# Patient Record
Sex: MALE | Race: OTHER | HISPANIC OR LATINO | URBAN - METROPOLITAN AREA
[De-identification: names, ages, dates, MRNs, and addresses within clinical notes are randomized per-mention and may not be internally consistent; named-entity substitution may affect disease eponyms.]

---

## 2020-07-24 ENCOUNTER — OUTPATIENT (OUTPATIENT)
Dept: OUTPATIENT SERVICES | Facility: HOSPITAL | Age: 20
LOS: 1 days | End: 2020-07-24

## 2020-07-24 ENCOUNTER — OUTPATIENT (OUTPATIENT)
Dept: OUTPATIENT SERVICES | Facility: HOSPITAL | Age: 20
LOS: 1 days | End: 2020-07-24
Payer: MEDICARE

## 2020-07-24 DIAGNOSIS — Z11.59 ENCOUNTER FOR SCREENING FOR OTHER VIRAL DISEASES: ICD-10-CM

## 2020-07-24 PROCEDURE — U0003: CPT

## 2020-07-25 DIAGNOSIS — Z11.59 ENCOUNTER FOR SCREENING FOR OTHER VIRAL DISEASES: ICD-10-CM

## 2020-07-25 LAB — SARS-COV-2 RNA SPEC QL NAA+PROBE: SIGNIFICANT CHANGE UP

## 2023-02-05 ENCOUNTER — EMERGENCY (EMERGENCY)
Facility: HOSPITAL | Age: 23
LOS: 0 days | Discharge: HOME | End: 2023-02-05
Attending: EMERGENCY MEDICINE
Payer: SELF-PAY

## 2023-02-05 VITALS
HEART RATE: 90 BPM | DIASTOLIC BLOOD PRESSURE: 84 MMHG | TEMPERATURE: 98 F | RESPIRATION RATE: 18 BRPM | OXYGEN SATURATION: 100 % | SYSTOLIC BLOOD PRESSURE: 140 MMHG

## 2023-02-05 VITALS
SYSTOLIC BLOOD PRESSURE: 118 MMHG | OXYGEN SATURATION: 97 % | TEMPERATURE: 98 F | RESPIRATION RATE: 16 BRPM | DIASTOLIC BLOOD PRESSURE: 77 MMHG | HEART RATE: 87 BPM

## 2023-02-05 DIAGNOSIS — J45.901 UNSPECIFIED ASTHMA WITH (ACUTE) EXACERBATION: ICD-10-CM

## 2023-02-05 DIAGNOSIS — F10.129 ALCOHOL ABUSE WITH INTOXICATION, UNSPECIFIED: ICD-10-CM

## 2023-02-05 PROCEDURE — 99284 EMERGENCY DEPT VISIT MOD MDM: CPT

## 2023-02-05 PROCEDURE — 99285 EMERGENCY DEPT VISIT HI MDM: CPT | Mod: 25

## 2023-02-05 PROCEDURE — 94640 AIRWAY INHALATION TREATMENT: CPT

## 2023-02-05 RX ORDER — ALBUTEROL 90 UG/1
2 AEROSOL, METERED ORAL ONCE
Refills: 0 | Status: COMPLETED | OUTPATIENT
Start: 2023-02-05 | End: 2023-02-05

## 2023-02-05 RX ADMIN — ALBUTEROL 2 PUFF(S): 90 AEROSOL, METERED ORAL at 18:22

## 2023-02-05 NOTE — ED ADULT NURSE NOTE - OBJECTIVE STATEMENT
Patient is alert and oriented X3, presenting to the ER with c/o altered mental status. Patient reports "my father left me looking over the car. I fell asleep because of my asthma. I drank 5 beers today. I have had a runny nose and cough, I have pain only when I cough.." As per triage note, "patient was found sleeping in the car which was parked in front of the bar." Patient denies CP, fevers and nausea. Patient resting in stretcher with no visible signs of distress noted.   HX: Asthma.

## 2023-02-05 NOTE — ED ADULT NURSE REASSESSMENT NOTE - NS ED NURSE REASSESS COMMENT FT1
PT CALLED SISTER FOR . I SPOKE TO SISTER AND SHE WILL COME TO  PATIENT. DR BOUDREAUX INFORMED AND WILL PREPARE FOR DC HOME.

## 2023-02-05 NOTE — ED ADULT TRIAGE NOTE - CHIEF COMPLAINT QUOTE
Subjective:     Principal Problem:<principal problem not specified>    Interval History: Continued EEG overnight without any witness seizure events    Objective:     Vital Signs (Most Recent):  Temp: 97.8 °F (36.6 °C) (04/12/22 0904)  Pulse: 87 (04/12/22 0904)  Resp: 18 (04/12/22 0904)  BP: (!) 106/49 (04/12/22 0904)  SpO2: 97 % (04/12/22 0904)   Vital Signs (24h Range):  Temp:  [97.7 °F (36.5 °C)-97.8 °F (36.6 °C)] 97.8 °F (36.6 °C)  Pulse:  [68-87] 87  Resp:  [18-20] 18  SpO2:  [97 %-99 %] 97 %  BP: (106-113)/(49-59) 106/49     Weight: 46.1 kg (101 lb 10.1 oz)  Body mass index is 20.49 kg/m².  HC Readings from Last 1 Encounters:   No data found for HC       Physical Exam  Vitals reviewed. Exam conducted with a chaperone present.   Constitutional:       General: She is active. She is not in acute distress.     Appearance: Normal appearance. She is not toxic-appearing.   HENT:      Head: Normocephalic and atraumatic.      Right Ear: External ear normal.      Left Ear: External ear normal.      Nose: Nose normal. No congestion.      Mouth/Throat:      Mouth: Mucous membranes are moist.      Pharynx: Oropharynx is clear. No posterior oropharyngeal erythema.   Eyes:      General:         Right eye: No discharge.         Left eye: No discharge.      Extraocular Movements: Extraocular movements intact.      Conjunctiva/sclera: Conjunctivae normal.      Pupils: Pupils are equal, round, and reactive to light.   Cardiovascular:      Rate and Rhythm: Normal rate and regular rhythm.      Pulses: Normal pulses.      Heart sounds: Normal heart sounds.   Pulmonary:      Effort: Pulmonary effort is normal. No respiratory distress or retractions.      Breath sounds: Normal breath sounds. No decreased air movement. No wheezing.   Abdominal:      General: Abdomen is flat. Bowel sounds are normal. There is no distension.      Palpations: Abdomen is soft. There is no mass.   Musculoskeletal:         General: Normal range of motion.  Pt BIBA from the street. Pt found sleeping in passenger seat in his car in front of the bar. Pt alert but confused, Hong Konger speaking, AOB+      Cervical back: Normal range of motion and neck supple.   Skin:     General: Skin is warm and dry.      Capillary Refill: Capillary refill takes less than 2 seconds.      Findings: No rash.   Neurological:      General: No focal deficit present.      Mental Status: She is alert, oriented to person, place, and time and oriented for age.      Cranial Nerves: No cranial nerve deficit.      Sensory: No sensory deficit.      Motor: No weakness.      Coordination: Coordination normal.   Psychiatric:         Mood and Affect: Mood normal.         Speech: Speech normal.         Behavior: Behavior normal.       NEUROLOGICAL EXAMINATION:     MENTAL STATUS   Oriented to person, place, and time.   Speech: speech is normal     CRANIAL NERVES     CN III, IV, VI   Pupils are equal, round, and reactive to light.

## 2023-02-05 NOTE — ED PROVIDER NOTE - CLINICAL SUMMARY MEDICAL DECISION MAKING FREE TEXT BOX
23 yo male BIBA to the ED from the street. Pt was found sleeping in passenger seat in his car in front of the bar. Pt c/o of mild SOB, cough. +AOB. ? of hx of asthma, has used Albuterol in the past. Pt mildly intoxicated in no acute distress. BSS slight wheezing on exam, 100% pulse ox. Impression, alcohol intoxication mild asthma. Plan: Albuterol 2 puffs monitor, observe, reassess 23 yo male BIBA to the ED from the street. Pt was found sleeping in passenger seat in his car in front of the bar. Pt c/o of mild SOB, cough. +AOB. ? of hx of asthma, has used Albuterol in the past. Pt mildly intoxicated in no acute distress. BSS slight wheezing on exam, 100% pulse ox. Impression, alcohol intoxication mild asthma.   Plan: Albuterol 2 puffs monitor, observe, reassess.    19:55:  Pt reassessed, no active complaints, feels breathing + better, clinically stable.  ED RN spoke with sister: she's en route to pick him up.  Pt stable for Dc in family's care. 21 yo male BIBA to the ED from the street. Pt was found sleeping in passenger seat in his car in front of the bar. Pt c/o of mild SOB, cough. +AOB. ? of hx of asthma, has used Albuterol in the past. Pt mildly intoxicated in no acute distress. VSS, slight wheezing on exam, 100% pulse ox on room air.   Impression: alcohol intoxication mild asthma exacerbation   Plan: Albuterol 2 puffs monitor, observe, reassess.    19:55:  Pt reassessed, no active complaints, feels breathing + better, clinically stable.  ED RN spoke with sister: she's en route to pick him up.  Pt stable for Dc in family's care, to use albuterol MDI given in ED.

## 2023-02-05 NOTE — ED PROVIDER NOTE - CONSTITUTIONAL, MLM
normal...  male alert no respiratory distress, non toxic, no acute distress  male alert no respiratory distress, non-toxic, no acute distress  male alert no respiratory distress, non-toxic, no acute distress.  Pt speaks comfortably in full sentences.

## 2023-02-05 NOTE — ED PROVIDER NOTE - ENMT, MLM
Airway patent, Nasal mucosa clear. Throat has no vesicles, no oropharyngeal exudates and uvula is midline. NC/AT oropharynx clear mucus membrane slightly dry, no clinical evidence of facial injury, voice is mildly hoarse.

## 2023-02-05 NOTE — ED PROVIDER NOTE - NSFOLLOWUPCLINICS_GEN_ALL_ED_FT
[facility] hospital  Family Medicine  [address] [place] [address]  Phone: [phone]  Fax:

## 2023-02-05 NOTE — ED PROVIDER NOTE - MUSCULOSKELETAL, MLM
AVENDANO x4 spine appears normal, range of motion is not limited, no muscle or joint tenderness AVENDANO x4 spine appears normal, range of motion is not limited, no muscle or joint tenderness.

## 2023-02-05 NOTE — ED ADULT NURSE NOTE - CHIEF COMPLAINT QUOTE
Pt BIBA from the street. Pt found sleeping in passenger seat in his car in front of the bar. Pt alert but confused, Estonian speaking, AOB+

## 2023-02-05 NOTE — ED PROVIDER NOTE - RESPIRATORY, MLM
Breath sounds clear and equal bilaterally, slight expiratory wheezing no retractions, pt not coughing during the exam Breath sounds clear and equal bilaterally, slight expiratory wheezing, no retractions, pt not coughing during the exam.  Normal respirations.

## 2023-02-05 NOTE — ED ADULT NURSE REASSESSMENT NOTE - NS ED NURSE REASSESS COMMENT FT1
PT ALERT AND ORIENTED TO PLACE, BREATHING UNLABORED, VSS, AFEBRILE, AND APPEARS IN NAD. VOIDED 500ML OF CLEAR URINE. FOOD AND JUICE GIVEN. UPDATED ON STATUS AND POC. WILL CONTINUE TO MONITOR.

## 2023-02-05 NOTE — ED PROVIDER NOTE - NSFOLLOWUPINSTRUCTIONS_ED_ALL_ED_FT
Avoid abusing alcohol.  Albuterol inhaler: 2 puffs every 6 hours as needed for wheezing, shortness of breath.  Follow up [facility].      Ataque de asma    Asthma Attack    Upper body outline showing parts of the respiratory system, highlighting the bronchi.   El ataque de asma, también llamado exacerbación del asma o broncoespasmo deirdre, es el estrechamiento y endurecimiento repentino de las vías respiratorias inferiores (bronquios) en los pulmones, que puede dificultar la respiración. El estrechamiento es causado por la inflamación y el endurecimiento del músculo liso que rodea las vías respiratorias inferiores en los pulmones.    Los ataques de asma pueden provocar tos, silbidos agudos al respirar, jessica siempre al exhalar (sibilancias), problemas para respirar (falta de aire) y dolor de pecho. Las vías respiratorias pueden producir mucosidad adicional causada por la inflamación y la irritación. Nataliia un ataque de asma, puede ser difícil respirar. Es importante recibir tratamiento de inmediato. Los ataques de asma pueden ser desde leves hasta potencialmente mortales.      ¿Cuáles son las causas?    Las posibles causas o desencadenantes de esta afección incluyen los siguientes:  •Alérgenos domésticos, bradley polvo, caspa de mascotas y cucarachas.      •El moho y el polen de los árboles o el césped.      •Las sustancias contaminantes del aire, bradley los limpiadores del hogar, los aerosoles, los olores intensos y el humo de cualquier clase.      •Cambios climáticos y aire frío.      •El estrés y las emociones felipe, bradley llorar o reír intensamente.      •El ejercicio o la actividad física que exige mucha energía.    •Ciertos medicamentos o afecciones médicas, tales bradley:  •Aspirina o betabloqueantes.      •Enfermedades infecciosas o inflamatorias, bradley la gripe (influenza), el resfrío, la neumonía o la inflamación de las membranas nasales (rinitis).      •Enfermedad de reflujo gastroesofágico (ERGE). La ERGE es pillo afección en la que el ácido estomacal vuelve al esófago y se derrama en la tráquea, lo que puede irritar las vías respiratorias.          ¿Cuáles son los signos o síntomas?    Los síntomas de esta afección incluyen:  •Sibilancias.      •Tos excesiva. [place] puede ocurrir solamente por la noche.      •Opresión o dolor en el pecho.      •Falta de aire.      •Dificultad para enunciar oraciones completas.      •Sensación de que no puede obtener el aire suficiente aunque [place] un esfuerzo por respirar (falta de aire).        ¿Cómo se diagnostica?    Esta afección se puede diagnosticar en función de lo siguiente:  •Un examen físico y los antecedentes médicos.      •Los síntomas.    •Estudios para buscar otras causas de los síntomas u otras afecciones que puedan desencadenar el ataque de asma. Estas pruebas pueden incluir lo siguiente:  •Pillo radiografía de tórax.      •Análisis de lee.      •Estudios de la función pulmonar (espirometría) para evaluar el flujo de aire en los pulmones.          ¿Cómo se trata?    El tratamiento de esta afección depende de la intensidad y la causa del ataque de asma.•En el mariela de los ataques leves, puede recibir medicamentos mediante un inhalador manual (inhalador con medidor de dosis, o IMD) o mediante un aparato que transforma el medicamento líquido en un vapor (nebulizador). Algunos de estos medicamentos son los siguientes:  •Medicamentos de rescate o de alivio rápido que relajan velozmente las vías respiratorias y los pulmones.      •Medicamentos de acción prolongada que se usan a diario para prevenir (controlar) los síntomas del asma.        •En el mariela de los ataques moderados o graves, es posible que lo traten con medicamentos con corticoesteroides por boca o mediante pillo inyección por vía intravenosa en el hospital.      •En el mariela de los ataques graves, puede necesitar oxigenoterapia o pillo máquina para respirar (respirador).      •Si la causa del ataque de asma es pillo infección por pillo bacteria, le darán antibióticos.        Siga estas instrucciones en robbins casa:    Medicamentos     •Use los medicamentos de venta hernandez y los recetados solamente bradley se lo haya indicado el médico.       •Si le recetaron un antibiótico, tómelo bradley se lo haya indicado el médico. No deje de usar el antibiótico aunque comience a sentirse mejor.      •Informe al médico si está embarazada o puede estar embarazada, para que abril se cerciore de que el medicamento para el asma pueda usarse con seguridad nataliia el embarazo.        Evite los factores desencadenantes   A sign showing that a person should not smoke.   •Lleve un registro de las cosas que desencadenan los ataques de asma. Evite la exposición a estos desencadenantes.      • No consuma ningún producto que contenga nicotina o tabaco. Estos productos incluyen cigarrillos, tabaco para mascar y aparatos de vapeo, bradley los cigarrillos electrónicos. Si necesita ayuda para dejar de fumar, consulte al médico.      •Cuando esté alto el nivel de polen, contaminación del aire o humedad, mantenga las ventanas cerradas y use el aire acondicionado o vaya a lugares con aire acondicionado.      Plan de acción para controlar el asma   •Trabaje con el médico para elaborar un plan escrito para el control y el tratamiento de los ataques de asma (plan de acción para el asma). El plan debe incluir lo siguiente:  •Pillo lista de los factores desencadenantes del asma y el modo de evitarlos.      •Pillo lista de los síntomas que puede tener nataliia un ataque de asma.      •Información sobre qué medicamento maren, cuándo tomarlo y qué cantidad.      •Información para ayudar a comprender las mediciones de flujo mirta.      •Medidas diarias que puede maren para controlar los síntomas del asma.      •Información de contacto para yolanda médicos.        •Si tiene un ataque de asma, actúe con rapidez. Siga los pasos de emergencia de robbins plan escrito de acción para el asma. [place] puede hacer que no necesite ir al hospital.      •Hable con un familiar o amigo cercano sobre robbins plan de acción para el asma y a quién contactar en mariela de que necesite ayuda.      Instrucciones generales     •Evite el ejercicio o la actividad física excesiva hasta que el ataque de asma se termine.      •Manténgase al día con todas las vacunas, bradley las vacunas antigripal y contra la neumonía.      •Concurra a todas las visitas de seguimiento. [place] es importante.        Comuníquese con un médico si:    •Ha seguido robbins plan de acción nataliia 1 hora y robbins lectura del flujo mirta se mantiene entre el 50 % y el 79 % de robbins mejor valor. [place] se encuentra en la neno amarilla que significa “precaución”.      •Necesita usar robbins medicamento de alivio rápido con mayor frecuencia que lo habitual.      •Los medicamentos le causan efectos secundarios, bradley erupción cutánea, picazón, hinchazón o dificultad para respirar.      •Los síntomas no mejoran después de usar medicamentos.      •Tiene fiebre.        Solicite ayuda de inmediato si:    •Robbins flujo mirta es katya que el 50 % del mejor valor personal. [place] se encuentra en la neno rishi, lo que significa “peligro”.      •Siente dolor o molestias en el pecho.      •Los medicamentos ya no tienen el efecto deseado.      •Al toser, elimina mucosidad con lee.      •Tiene fiebre, y los síntomas empeoran repentinamente.      •Tiene dificultad para tragar.      •Se siente muy cansado y respirar le provoca cansancio.      Estos síntomas pueden indicar pillo emergencia. Solicite ayuda de inmediato. Llame al 911.   • No espere a sumit si los síntomas desaparecen.        • No conduzca por yolanda propios medios hasta el hospital.         Resumen    •La causa de los ataques de asma es el estrechamiento o la falta de espacio en las vías respiratorias, lo que causa falta de aire, tos y sibilancia.      •Los factores desencadenantes de un ataque de asma pueden ser varios, tales bradley alérgenos, cambios climáticos, actividad física, olores intensos y humo de cualquier clase.      •Si tiene un ataque de asma, actúe con rapidez. Siga los pasos de emergencia de robbins plan escrito de acción para el asma.      •Busque ayuda de inmediato si tiene pillo intensa dificultad para respirar, dolor en el pecho o fiebre, o si los medicamentos que tiene en robbins hogar ya no son eficaces para aliviar los síntomas.      Esta información no tiene bradley fin reemplazar el consejo del médico. Asegúrese de hacerle al médico cualquier pregunta que tenga.

## 2023-02-05 NOTE — ED PROVIDER NOTE - PATIENT PORTAL LINK FT
You can access the FollowMyHealth Patient Portal offered by [facility] by registering at the following website: [email or web address]. By joining [facility]’s FollowMyHealth portal, you will also be able to view your health information using other applications (apps) compatible with our system.

## 2023-02-05 NOTE — ED PROVIDER NOTE - OBJECTIVE STATEMENT
[id number]  21 yo male wPMHx of asthma BIBA to the ED from the street. Pt was found sleeping in passenger seat in his car in front of the bar. Pt c/o of SOB, cough with whit fleam. +AOB. Pt admits to drinking, denies any drug use. Pt is Covid vaccinated.  Denies any pain, chest pain, fever, SI/HI.  [id number]  21 yo male w/ PMHx of asthma BIBA to the ED from the street. Pt was found sleeping in passenger seat in his car in front of the bar. Pt c/o of mild SOB, cough with white phlegm. +AOB. Pt admits to drinking EtOH, denies any drug use. Pt is Covid vaccinated.  Denies any pain, chest pain, fever, SI/HI.

## 2024-10-02 NOTE — ED ADULT NURSE NOTE - TELEPHONIC ID NUMBER OF THE INTERPRETER
[Recent Weight Loss (___ Lbs)] : recent weight loss: [unfilled] lbs [Fatigue] : no fatigue [Decreased Appetite] : appetite not decreased [Recent Weight Gain (___ Lbs)] : no recent weight gain [Visual Field Defect] : no visual field defect [Dysphagia] : no dysphagia [Chest Pain] : no chest pain [Palpitations] : no palpitations [Lower Ext Edema] : no lower extremity edema [Shortness Of Breath] : no shortness of breath [Cough] : no cough [Orthopnea] : no orthopnea [Nausea] : no nausea [Constipation] : no constipation [Abdominal Pain] : no abdominal pain [Vomiting] : no vomiting [id number] [Diarrhea] : no diarrhea [Polyuria] : no polyuria [Joint Pain] : no joint pain [Muscle Weakness] : no muscle weakness [Myalgia] : no myalgia  [Acanthosis] : no acanthosis  [Acne] : no acne [Dry Skin] : no dry skin [Headaches] : no headaches [Dizziness] : no dizziness [Tremors] : no tremors [Depression] : no depression [Insomnia] : no insomnia [Anxiety] : no anxiety [Polydipsia] : no polydipsia [Cold Intolerance] : no cold intolerance [Heat Intolerance] : no heat intolerance [Easy Bleeding] : no ~M tendency for easy bleeding [Easy Bruising] : no tendency for easy bruising

## 2024-12-06 ENCOUNTER — EMERGENCY (EMERGENCY)
Facility: HOSPITAL | Age: 24
LOS: 0 days | Discharge: HOME | End: 2024-12-06
Attending: STUDENT IN AN ORGANIZED HEALTH CARE EDUCATION/TRAINING PROGRAM
Payer: COMMERCIAL

## 2024-12-06 VITALS
TEMPERATURE: 99 F | RESPIRATION RATE: 18 BRPM | WEIGHT: 151.46 LBS | DIASTOLIC BLOOD PRESSURE: 75 MMHG | HEART RATE: 96 BPM | SYSTOLIC BLOOD PRESSURE: 142 MMHG | OXYGEN SATURATION: 95 %

## 2024-12-06 DIAGNOSIS — R06.02 SHORTNESS OF BREATH: ICD-10-CM

## 2024-12-06 DIAGNOSIS — J45.901 UNSPECIFIED ASTHMA WITH (ACUTE) EXACERBATION: ICD-10-CM

## 2024-12-06 LAB
FLUAV AG NPH QL: SIGNIFICANT CHANGE UP
FLUBV AG NPH QL: SIGNIFICANT CHANGE UP
RSV RNA NPH QL NAA+NON-PROBE: SIGNIFICANT CHANGE UP
SARS-COV-2 RNA SPEC QL NAA+PROBE: SIGNIFICANT CHANGE UP

## 2024-12-06 PROCEDURE — 99285 EMERGENCY DEPT VISIT HI MDM: CPT | Mod: 25

## 2024-12-06 PROCEDURE — 71046 X-RAY EXAM CHEST 2 VIEWS: CPT

## 2024-12-06 PROCEDURE — 99284 EMERGENCY DEPT VISIT MOD MDM: CPT

## 2024-12-06 PROCEDURE — 94640 AIRWAY INHALATION TREATMENT: CPT

## 2024-12-06 PROCEDURE — 0241U: CPT

## 2024-12-06 PROCEDURE — 71046 X-RAY EXAM CHEST 2 VIEWS: CPT | Mod: 26

## 2024-12-06 RX ORDER — ALBUTEROL 90 MCG
2 AEROSOL (GRAM) INHALATION
Qty: 1 | Refills: 0
Start: 2024-12-06 | End: 2025-01-04

## 2024-12-06 RX ORDER — PREDNISONE 20 MG/1
2 TABLET ORAL
Qty: 10 | Refills: 0
Start: 2024-12-06 | End: 2024-12-10

## 2024-12-06 RX ORDER — IPRATROPIUM BROMIDE AND ALBUTEROL SULFATE 2.5; .5 MG/3ML; MG/3ML
3 SOLUTION RESPIRATORY (INHALATION)
Refills: 0 | Status: COMPLETED | OUTPATIENT
Start: 2024-12-06 | End: 2024-12-06

## 2024-12-06 RX ORDER — PREDNISONE 20 MG/1
50 TABLET ORAL ONCE
Refills: 0 | Status: COMPLETED | OUTPATIENT
Start: 2024-12-06 | End: 2024-12-06

## 2024-12-06 RX ADMIN — IPRATROPIUM BROMIDE AND ALBUTEROL SULFATE 3 MILLILITER(S): 2.5; .5 SOLUTION RESPIRATORY (INHALATION) at 21:12

## 2024-12-06 RX ADMIN — PREDNISONE 50 MILLIGRAM(S): 20 TABLET ORAL at 21:11

## 2024-12-06 RX ADMIN — IPRATROPIUM BROMIDE AND ALBUTEROL SULFATE 3 MILLILITER(S): 2.5; .5 SOLUTION RESPIRATORY (INHALATION) at 21:40

## 2024-12-06 RX ADMIN — IPRATROPIUM BROMIDE AND ALBUTEROL SULFATE 3 MILLILITER(S): 2.5; .5 SOLUTION RESPIRATORY (INHALATION) at 21:25

## 2024-12-06 NOTE — ED STATDOCS - CLINICAL SUMMARY MEDICAL DECISION MAKING FREE TEXT BOX
Nilson Bartlett DO (Attending): 23-year-old male past ministry of asthma not on controller medications, never been admitted presents ED complaining of 2 days of worsening cough and shortness of breath.  Patient with bilateral wheezing but no increased work of breathing, hemodynamically stable otherwise well-appearing not hypoxic.  Differential includes not limited to mild asthma exacerbation.  Plan for nebs, steroids, chest x-ray to rule out pneumonia.  Reassess

## 2024-12-06 NOTE — ED ADULT NURSE NOTE - NSFALLUNIVINTERV_ED_ALL_ED
Bed/Stretcher in lowest position, wheels locked, appropriate side rails in place/Call bell, personal items and telephone in reach/Instruct patient to call for assistance before getting out of bed/chair/stretcher/Non-slip footwear applied when patient is off stretcher/[place] to call system/Physically safe environment - no spills, clutter or unnecessary equipment/Purposeful proactive rounding/Room/bathroom lighting operational, light cord in reach

## 2024-12-06 NOTE — ED STATDOCS - PHYSICAL EXAMINATION
GEN: Patient awake alert NAD.   HEENT: normocephalic, atraumatic, EOMI, no scleral icterus, moist MM  CARDIAC: RRR, S1, S2, no murmur.   PULM: Diffuse wheezing bilaterally, no tachypnea or increased work of breathing  ABD: soft NT, ND, no rebound no guarding,  MSK: Moving all extremities, no edema.  NEURO: A&Ox3, gait normal, no focal neurological deficits,  SKIN: warm, dry, no rash.

## 2024-12-06 NOTE — ED ADULT NURSE NOTE - OBJECTIVE STATEMENT
pt is asthmatic, has an inhaler, states he has a lingering cough and is more SOB w/o relief. Pt was audibly wheezing per LESLI Don, provided w/nebs and steroids with improvement

## 2024-12-06 NOTE — ED STATDOCS - NSFOLLOWUPCLINICS_GEN_ALL_ED_FT
[facility] [place] [address] [place] [address]  Phone: [phone]  Fax:

## 2024-12-06 NOTE — ED STATDOCS - OBJECTIVE STATEMENT
23-year-old male past ministry of asthma not on controller medications, never been admitted presents ED complaining of 2 days of worsening cough and shortness of breath.  Patient denies fever, chest pain, nausea, vomiting, abdominal pain, dysuria, diarrhea.    Patient is primarily Belarusian-speaking,  #[id number] was used

## 2024-12-06 NOTE — ED STATDOCS - NSFOLLOWUPINSTRUCTIONS_ED_ALL_ED_FT
Ataque de asma  Asthma Attack  Upper body outline showing parts of the respiratory system, highlighting the bronchi.  El ataque de asma, también llamado exacerbación del asma o broncoespasmo deirdre, es el estrechamiento y endurecimiento repentino de las vías respiratorias inferiores (bronquios) en los pulmones, que puede dificultar la respiración. El estrechamiento es causado por la inflamación y el endurecimiento del músculo liso que rodea las vías respiratorias inferiores en los pulmones.    Los ataques de asma pueden provocar tos, silbidos agudos al respirar, jessica siempre al exhalar (sibilancias), problemas para respirar (falta de aire) y dolor de pecho. Las vías respiratorias pueden producir mucosidad adicional causada por la inflamación y la irritación. Nataliia un ataque de asma, puede ser difícil respirar. Es importante recibir tratamiento de inmediato. Los ataques de asma pueden ser desde leves hasta potencialmente mortales.    ¿Cuáles son las causas?  Las posibles causas o desencadenantes de esta afección incluyen los siguientes:  Alérgenos domésticos, bradley polvo, caspa de mascotas y cucarachas.  El moho y el polen de los árboles o el césped.  Las sustancias contaminantes del aire, bradley los limpiadores del hogar, los aerosoles, los olores intensos y el humo de cualquier clase.  Cambios climáticos y aire frío.  El estrés y las emociones felipe, bradley llorar o reír intensamente.  El ejercicio o la actividad física que exige mucha energía.  Ciertos medicamentos o afecciones médicas, tales bradley:  Aspirina o betabloqueantes.  Enfermedades infecciosas o inflamatorias, bradley la gripe (influenza), el resfrío, la neumonía o la inflamación de las membranas nasales (rinitis).  Enfermedad de reflujo gastroesofágico (ERGE). La ERGE es pillo afección en la que el ácido estomacal vuelve al esófago y se derrama en la tráquea, lo que puede irritar las vías respiratorias.  ¿Cuáles son los signos o síntomas?  Los síntomas de esta afección incluyen:  Sibilancias.  Tos excesiva. [place] puede ocurrir solamente por la noche.  Opresión o dolor en el pecho.  Falta de aire.  Dificultad para enunciar oraciones completas.  Sensación de que no puede obtener el aire suficiente aunque [place] un esfuerzo por respirar (falta de aire).  ¿Cómo se diagnostica?  Esta afección se puede diagnosticar en función de lo siguiente:  Un examen físico y los antecedentes médicos.  Los síntomas.  Estudios para buscar otras causas de los síntomas u otras afecciones que puedan desencadenar el ataque de asma. Estas pruebas pueden incluir lo siguiente:  Pillo radiografía de tórax.  Análisis de lee.  Estudios de la función pulmonar (espirometría) para evaluar el flujo de aire en los pulmones.  ¿Cómo se trata?  El tratamiento de esta afección depende de la intensidad y la causa del ataque de asma.  En el mariela de los ataques leves, puede recibir medicamentos mediante un inhalador manual (inhalador con medidor de dosis, o IMD) o mediante un aparato que transforma el medicamento líquido en un vapor (nebulizador). Algunos de estos medicamentos son los siguientes:  Medicamentos de rescate o de alivio rápido que relajan velozmente las vías respiratorias y los pulmones.  Medicamentos de acción prolongada que se usan a diario para prevenir (controlar) los síntomas del asma.  En el mariela de los ataques moderados o graves, es posible que lo traten con medicamentos con corticoesteroides por boca o mediante pillo inyección por vía intravenosa en el hospital.  En el mariela de los ataques graves, puede necesitar oxigenoterapia o pillo máquina para respirar (respirador).  Si la causa del ataque de asma es pillo infección por pillo bacteria, le darán antibióticos.  Siga estas instrucciones en robbins casa:  Medicamentos    Use los medicamentos de venta hernandez y los recetados solamente bradley se lo haya indicado el médico.  Si le recetaron un antibiótico, tómelo bradley se lo haya indicado el médico. No deje de usar el antibiótico aunque comience a sentirse mejor.  Informe al médico si está embarazada o puede estar embarazada, para que abril se cerciore de que el medicamento para el asma pueda usarse con seguridad nataliia el embarazo.  Evite los factores desencadenantes    A sign showing that a person should not smoke.  Lleve un registro de las cosas que desencadenan los ataques de asma. Evite la exposición a estos desencadenantes.  No consuma ningún producto que contenga nicotina o tabaco. Estos productos incluyen cigarrillos, tabaco para mascar y aparatos de vapeo, bradley los cigarrillos electrónicos. Si necesita ayuda para dejar de fumar, consulte al médico.  Cuando esté alto el nivel de polen, contaminación del aire o humedad, mantenga las ventanas cerradas y use el aire acondicionado o vaya a lugares con aire acondicionado.  Plan de acción para controlar el asma    Trabaje con el médico para elaborar un plan escrito para el control y el tratamiento de los ataques de asma (plan de acción para el asma). El plan debe incluir lo siguiente:  Pillo lista de los factores desencadenantes del asma y el modo de evitarlos.  Pillo lista de los síntomas que puede tener nataliia un ataque de asma.  Información sobre qué medicamento maren, cuándo tomarlo y qué cantidad.  Información para ayudar a comprender las mediciones de flujo mirta.  Medidas diarias que puede maren para controlar los síntomas del asma.  Información de contacto para yolanda médicos.  Si tiene un ataque de asma, actúe con rapidez. Siga los pasos de emergencia de robbins plan escrito de acción para el asma. [place] puede hacer que no necesite ir al hospital.  Hable con un familiar o amigo cercano sobre robbins plan de acción para el asma y a quién contactar en mariela de que necesite ayuda.  Instrucciones generales    Evite el ejercicio o la actividad física excesiva hasta que el ataque de asma se termine.  Manténgase al día con todas las vacunas, bradley las vacunas antigripal y contra la neumonía.  Concurra a todas las visitas de seguimiento. [place] es importante.  Comuníquese con un médico si:  Ha seguido robbins plan de acción nataliia 1 hora y robbins lectura del flujo mirta se mantiene entre el 50 % y el 79 % de robbins mejor valor. [place] se encuentra en la neno amarilla que significa “precaución”.  Necesita usar robbins medicamento de alivio rápido con mayor frecuencia que lo habitual.  Los medicamentos le causan efectos secundarios, bradley erupción cutánea, picazón, hinchazón o dificultad para respirar.  Los síntomas no mejoran después de usar medicamentos.  Tiene fiebre.  Solicite ayuda de inmediato si:  [place] flujo mitra es katya que el 50 % del mejor valor personal. [place] se encuentra en la neno rishi, lo que significa “peligro”.  Siente dolor o molestias en el pecho.  Los medicamentos ya no tienen el efecto deseado.  Al toser, elimina mucosidad con ele.  Tiene fiebre, y los síntomas empeoran repentinamente.  Tiene dificultad para tragar.  Se siente muy cansado y respirar le provoca cansancio.  Estos síntomas pueden indicar pillo emergencia. Solicite ayuda de inmediato. Llame al 911.  No espere a sumit si los síntomas desaparecen.  No conduzca por yolanda propios medios hasta el hospital.  Resumen  La causa de los ataques de asma es el estrechamiento o la falta de espacio en las vías respiratorias, lo que causa falta de aire, tos y sibilancia.  Los factores desencadenantes de un ataque de asma pueden ser varios, tales bradley alérgenos, cambios climáticos, actividad física, olores intensos y humo de cualquier clase.  Si tiene un ataque de asma, actúe con rapidez. Siga los pasos de emergencia de robbins plan escrito de acción para el asma.  Busque ayuda de inmediato si tiene pillo intensa dificultad para respirar, dolor en el pecho o fiebre, o si los medicamentos que tiene en robbins hogar ya no son eficaces para aliviar los síntomas.

## 2024-12-06 NOTE — ED STATDOCS - PATIENT PORTAL LINK FT
You can access the FollowMyHealth Patient Portal offered by [facility] by registering at the following website: [email or web address]. By joining [employer]’s FollowMyHealth portal, you will also be able to view your health information using other applications (apps) compatible with our system.

## 2024-12-06 NOTE — ED STATDOCS - PROGRESS NOTE DETAILS
Using , 23-year-old Bengali-speaking male presents with family for asthma exacerbation.  Patient states that he notices asthma worsening since yesterday.  Patient has not been taking any medication and does not have a primary care doctor that he follows up with an uric.  Associated cough and subjective fevers at home.  Bilateral expiratory wheezing.  Vitals unremarkable in the emergency room.  Patient is afebrile and O2 sat is 95% on room air.  Will check x-ray, nebulizer treatment, steroids for his wheezing and will reevaluate. -Arian Stone PA-C CXR unremarkable. Pending administration of meds and reeval. -Arian Stone PA-C USing SI [id number], discussed unremarkable CXR with pt. Lungs are clearer to auscultation and wheezing has improved. Pt states he feels better. Will d/c home with albuterol and prednisone and give Petar to f/u with. -Arian Stone PA-C

## 2024-12-06 NOTE — ED ADULT TRIAGE NOTE - CHIEF COMPLAINT QUOTE
asthma, worsening wheezing past couple of days, using albuterol inhaler at home with minimal relief. no respiratory distress at triage

## 2024-12-07 PROBLEM — J45.909 UNSPECIFIED ASTHMA, UNCOMPLICATED: Chronic | Status: ACTIVE | Noted: 2023-02-07

## 2025-07-05 ENCOUNTER — EMERGENCY (EMERGENCY)
Facility: HOSPITAL | Age: 25
LOS: 0 days | Discharge: HOME | End: 2025-07-05
Attending: EMERGENCY MEDICINE
Payer: COMMERCIAL

## 2025-07-05 VITALS
SYSTOLIC BLOOD PRESSURE: 129 MMHG | RESPIRATION RATE: 18 BRPM | DIASTOLIC BLOOD PRESSURE: 64 MMHG | TEMPERATURE: 98 F | OXYGEN SATURATION: 98 % | HEART RATE: 83 BPM

## 2025-07-05 VITALS — WEIGHT: 151.9 LBS

## 2025-07-05 DIAGNOSIS — S50.311A ABRASION OF RIGHT ELBOW, INITIAL ENCOUNTER: ICD-10-CM

## 2025-07-05 DIAGNOSIS — S92.421A DISPLACED FRACTURE OF DISTAL PHALANX OF RIGHT GREAT TOE, INITIAL ENCOUNTER FOR CLOSED FRACTURE: ICD-10-CM
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
## 2025-07-05 PROCEDURE — 96372 THER/PROPH/DIAG INJ SC/IM: CPT | Mod: XU

## 2025-07-05 PROCEDURE — 73080 X-RAY EXAM OF ELBOW: CPT | Mod: RT

## 2025-07-05 PROCEDURE — 73610 X-RAY EXAM OF ANKLE: CPT | Mod: 26,RT

## 2025-07-05 PROCEDURE — 70486 CT MAXILLOFACIAL W/O DYE: CPT | Mod: 26

## 2025-07-05 PROCEDURE — 73564 X-RAY EXAM KNEE 4 OR MORE: CPT | Mod: 26,RT

## 2025-07-05 PROCEDURE — 70450 CT HEAD/BRAIN W/O DYE: CPT | Mod: 26

## 2025-07-05 PROCEDURE — 99053 MED SERV 10PM-8AM 24 HR FAC: CPT

## 2025-07-05 PROCEDURE — 70486 CT MAXILLOFACIAL W/O DYE: CPT

## 2025-07-05 PROCEDURE — 73564 X-RAY EXAM KNEE 4 OR MORE: CPT | Mod: RT

## 2025-07-05 PROCEDURE — 99285 EMERGENCY DEPT VISIT HI MDM: CPT

## 2025-07-05 PROCEDURE — 73620 X-RAY EXAM OF FOOT: CPT | Mod: 26,RT

## 2025-07-05 PROCEDURE — 28490 TREAT BIG TOE FRACTURE: CPT | Mod: T5

## 2025-07-05 PROCEDURE — 99284 EMERGENCY DEPT VISIT MOD MDM: CPT | Mod: 25

## 2025-07-05 PROCEDURE — 73080 X-RAY EXAM OF ELBOW: CPT | Mod: 26,RT

## 2025-07-05 PROCEDURE — 72125 CT NECK SPINE W/O DYE: CPT

## 2025-07-05 PROCEDURE — 70450 CT HEAD/BRAIN W/O DYE: CPT

## 2025-07-05 PROCEDURE — 73610 X-RAY EXAM OF ANKLE: CPT | Mod: RT

## 2025-07-05 PROCEDURE — 73620 X-RAY EXAM OF FOOT: CPT | Mod: RT

## 2025-07-05 PROCEDURE — 76376 3D RENDER W/INTRP POSTPROCES: CPT | Mod: 26

## 2025-07-05 PROCEDURE — 76376 3D RENDER W/INTRP POSTPROCES: CPT

## 2025-07-05 PROCEDURE — 72125 CT NECK SPINE W/O DYE: CPT | Mod: 26

## 2025-07-05 RX ORDER — KETOROLAC TROMETHAMINE 30 MG/ML
15 INJECTION, SOLUTION INTRAMUSCULAR; INTRAVENOUS ONCE
Refills: 0 | Status: DISCONTINUED | OUTPATIENT
Start: 2025-07-05 | End: 2025-07-05

## 2025-07-05 RX ADMIN — KETOROLAC TROMETHAMINE 15 MILLIGRAM(S): 30 INJECTION, SOLUTION INTRAMUSCULAR; INTRAVENOUS at 10:46
